# Patient Record
Sex: FEMALE | Race: WHITE | Employment: FULL TIME | URBAN - METROPOLITAN AREA
[De-identification: names, ages, dates, MRNs, and addresses within clinical notes are randomized per-mention and may not be internally consistent; named-entity substitution may affect disease eponyms.]

---

## 2017-01-18 ENCOUNTER — TELEPHONE (OUTPATIENT)
Dept: NEUROLOGY | Age: 47
End: 2017-01-18

## 2017-01-18 NOTE — TELEPHONE ENCOUNTER
Spoke to patient and scheduled nerve block. Message sent to [address] [place] [address]  to verify authorization. Appears to be approved in notes.

## 2017-01-18 NOTE — TELEPHONE ENCOUNTER
Pt called and wants lisa to call her back about the injection dr Teena Madrigal wants her to have. Pt said nurse was supposed to call her back about it. Please call.

## 2017-01-27 ENCOUNTER — OFFICE VISIT (OUTPATIENT)
Dept: NEUROLOGY | Age: 47
End: 2017-01-27

## 2017-01-27 VITALS
SYSTOLIC BLOOD PRESSURE: 110 MMHG | RESPIRATION RATE: 20 BRPM | HEIGHT: 67 IN | DIASTOLIC BLOOD PRESSURE: 64 MMHG | BODY MASS INDEX: 25.9 KG/M2 | WEIGHT: 165 LBS

## 2017-01-27 DIAGNOSIS — G50.0 SUPRAORBITAL NEURALGIA: Primary | ICD-10-CM

## 2017-01-27 NOTE — PATIENT INSTRUCTIONS
Bupivacaine (By injection)   Bupivacaine ([id number]-[id number]-v-rojas)  Used to numb an area of your body during surgery or other procedures, childbirth, or dental work. This medicine is a local anesthetic. Brand Name(s):BL Injection Kit, BT Injection Kit, Bupivacaine Spinal, Bupivilog Kit, Dexlido-M, Dyural-40, Dyural-80, Dyural-LM, Ketorocaine-LM, MLK F1 Kit, MLK F2 Kit, MLK F3 Kit, MLK F4, MLK Procedure F1 Kit, MLP A-1   There may be other brand names for this medicine. When This Medicine Should Not Be Used: This medicine is not right for everyone. Do not use it if you had an allergic reaction to bupivacaine or other types of local anesthetic. How to Use This Medicine:   Injectable  · Your doctor will prescribe your exact dose and tell you how the medicine will be given. This medicine is sometimes given through a catheter placed in your lower back for an epidural or a spinal block. You may also receive the injection into your rib cage, chest, or other body area. This medicine is injected directly into your gums for dental work. · A nurse or other health provider will give you this medicine. Drugs and Foods to Avoid:   Ask your doctor or pharmacist before using any other medicine, including over-the-counter medicines, vitamins, and herbal products. · Some medicines can affect how bupivacaine works. Tell your doctor if you are taking any of the following:   ¨ An MAO inhibitor  ¨ A tricyclic antidepressant  ¨ Blood pressure medicine, such as atenolol, doxazosin, lisinopril, terazosin, metoprolol  ¨ Depression medicine, such as amitriptyline, nortriptyline  ¨ Ergot medicine or other medicines for headaches or migraines  ¨ Phenothiazine medicine, such as promethazine, chlorpromazine  · Tell your doctor if you use anything else that makes you sleepy. Some examples are allergy medicine, narcotic pain medicine, and alcohol.   Warnings While Using This Medicine:   · If you are not receiving this medicine for childbirth, tell your doctor if you are pregnant or breastfeeding. Tell your doctor if you have asthma, diabetes, liver, kidney, or heart disease, seizures, myasthenia gravis, thyroid problems, circulation problems, high blood pressure, low blood pressure, or methemoglobinemia. · This medicine may make you dizzy or drowsy. Do not drive or do anything that could be dangerous until you know how this medicine affects you. · This medicine should cause numbness only to the area where it is injected. It is not meant to cause you to fall asleep or become unconscious. · It may be easier to hurt yourself while your treated body area is still numb. Be careful to avoid injury until you have regained all the feeling and are no longer numb. · If you are receiving this medicine as an epidural to ease labor pains, it may take longer than normal for you to push your baby out. It is also possible that the baby may have unwanted effects after birth (sleepiness, slow responses). Talk to your doctor if you have questions about how this medicine might affect your baby. Possible Side Effects While Using This Medicine:   Call your doctor right away if you notice any of these side effects:  · Allergic reaction: Itching or hives, swelling in your face or hands, swelling or tingling in your mouth or throat, chest tightness, trouble breathing  · Anxiety, depression, restlessness, drowsiness, ringing in your ears, blurred vision  · Chest pain, fast, pounding, slow, or uneven heartbeat, trouble breathing  · Lightheadedness or fainting  · Nausea, vomiting, chills, metallic taste in your mouth  · Seizures, shivering, shaking, or tremors  If you notice these less serious side effects, talk with your doctor:   · Headache, back pain  · Pain, redness, or swelling where the needle was placed  If you notice other side effects that you think are caused by this medicine, tell your doctor. Call your doctor for medical advice about side effects.  You may report side effects to FDA at 5-368-FDA-1034  © 2016 [address] is for End User's use only and may not be sold, redistributed or otherwise used for commercial purposes. The above information is an  only. It is not intended as medical advice for individual conditions or treatments. Talk to your doctor, nurse or pharmacist before following any medical regimen to see if it is safe and effective for you.

## 2017-01-27 NOTE — MR AVS SNAPSHOT
Visit Information Date & Time Provider Department Dept. Phone Encounter #  
 1/27/2017 10:00 AM Ara Jha MD [facility] [phone] [id number] Upcoming Health Maintenance Date Due DTaP/Tdap/Td series (1 - Tdap) 9/16/1992 PAP AKA CERVICAL CYTOLOGY 9/16/1992 BREAST CANCER SCRN MAMMOGRAM 8/27/2015 INFLUENZA AGE 9 TO ADULT 8/1/2016 COLONOSCOPY 7/19/2021 Allergies as of 1/27/2017  Review Complete On: 12/30/2016 By: Meredith Kenny MD  
  
 Severity Noted Reaction Type Reactions Ambien [Zolpidem]  08/13/2014    Other (comments) Too groggy Erythromycin  10/21/2011    Hives Keflex [Cephalexin]  11/17/2011    Hives Lunesta [Eszopiclone]  08/13/2014    Other (comments) Too groggy Pcn [Penicillins]  10/21/2011    Hives Sulfa (Sulfonamide Antibiotics)  10/21/2011    Hives Current Immunizations  Reviewed on 8/13/2014 Name Date Influenza Vaccine Split 10/27/2011 Influenza Vaccine Whole 10/29/2012 Not reviewed this visit You Were Diagnosed With   
  
 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  
 25.84 kg/m 2 1.88 m 2 Preferred Pharmacy Pharmacy Name Phone [facility] [address] [place] [address] Av [phone] Your Updated Medication List  
  
   
This list is accurate as of: 1/27/17 10:16 AM.  Always use your most recent med list.  
  
  
  
  
 ARMOUR THYROID 60 mg tablet Generic drug:  thyroid (Pork) 15 mg daily. calcium carbonate-vitamin d2 1,200-400 mg-unit Cap Take  by mouth. citalopram 40 mg tablet Commonly known as:  Felipe Mao Take 1 Tab by mouth daily. CONTRAVE 8-90 mg Tber ER tablet Generic drug:  naltrexone-buPROPion Take 3 Tabs by mouth daily. First Month: Contrave 8mg/90mg #70 Week 1 : 1 Tab AM Week 2 : 1 Tab AM, 1 Tab PM Week 3 : 2 Tab AM, 1 Tab PM Week 4 : 2 Tab AM, 2 Tab PM Week 5 and Beyond: Contrave 8mg/90mg #120  2 Tab AM, 2 Tab PM  
  
 cyanocobalamin 500 mcg tablet Commonly known as:  VITAMIN B12 Take 500 mcg by mouth daily. LINZESS 290 mcg Cap capsule Generic drug:  linaclotide Take  by mouth Daily (before breakfast). multivitamin tablet Commonly known as:  ONE A DAY Take 1 Tab by mouth daily. omeprazole 40 mg capsule Commonly known as:  PRILOSEC Take 40 mg by mouth daily. RECLIPSEN (28) 0.15-0.03 mg Tab Generic drug:  desogestrel-ethinyl estradiol Take 1 Tab by mouth daily. topiramate 100 mg tablet Commonly known as:  TOPAMAX Take 1 tablet by mouth two (2) times a day. traZODone 150 mg tablet Commonly known as:  [place] Take 1 Tab by mouth nightly. Patient Instructions Bupivacaine (By injection) Bupivacaine (wwb-XIZ-u-rojas) Used to numb an area of your body during surgery or other procedures, childbirth, or dental work. This medicine is a local anesthetic. Brand Name(s):BL Injection Kit, BT Injection Kit, Bupivacaine Spinal, Bupivilog Kit, Dexlido-M, Dyural-40, Dyural-80, Dyural-LM, Ketorocaine-LM, MLK F1 Kit, MLK F2 Kit, MLK F3 Kit, MLK F4, MLK Procedure F1 Kit, MLP A-1 There may be other brand names for this medicine. When This Medicine Should Not Be Used: This medicine is not right for everyone. Do not use it if you had an allergic reaction to bupivacaine or other types of local anesthetic. How to Use This Medicine:  
Injectable · Your doctor will prescribe your exact dose and tell you how the medicine will be given. This medicine is sometimes given through a catheter placed in your lower back for an epidural or a spinal block. You may also receive the injection into your rib cage, chest, or other body area. This medicine is injected directly into your gums for dental work. · A nurse or other health provider will give you this medicine. Drugs and Foods to Avoid: Ask your doctor or pharmacist before using any other medicine, including over-the-counter medicines, vitamins, and herbal products. · Some medicines can affect how bupivacaine works. Tell your doctor if you are taking any of the following: ¨ An MAO inhibitor ¨ A tricyclic antidepressant ¨ Blood pressure medicine, such as atenolol, doxazosin, lisinopril, terazosin, metoprolol ¨ Depression medicine, such as amitriptyline, nortriptyline ¨ Ergot medicine or other medicines for headaches or migraines ¨ Phenothiazine medicine, such as promethazine, chlorpromazine · Tell your doctor if you use anything else that makes you sleepy. Some examples are allergy medicine, narcotic pain medicine, and alcohol. Warnings While Using This Medicine: · If you are not receiving this medicine for childbirth, tell your doctor if you are pregnant or breastfeeding. Tell your doctor if you have asthma, diabetes, liver, kidney, or heart disease, seizures, myasthenia gravis, thyroid problems, circulation problems, high blood pressure, low blood pressure, or methemoglobinemia. · This medicine may make you dizzy or drowsy. Do not drive or do anything that could be dangerous until you know how this medicine affects you. · This medicine should cause numbness only to the area where it is injected. It is not meant to cause you to fall asleep or become unconscious. · It may be easier to hurt yourself while your treated body area is still numb. Be careful to avoid injury until you have regained all the feeling and are no longer numb. · If you are receiving this medicine as an epidural to ease labor pains, it may take longer than normal for you to push your baby out. It is also possible that the baby may have unwanted effects after birth (sleepiness, slow responses). Talk to your doctor if you have questions about how this medicine might affect your baby. Possible Side Effects While Using This Medicine:  
Call your doctor right away if you notice any of these side effects: · Allergic reaction: Itching or hives, swelling in your face or hands, swelling or tingling in your mouth or throat, chest tightness, trouble breathing · Anxiety, depression, restlessness, drowsiness, ringing in your ears, blurred vision · Chest pain, fast, pounding, slow, or uneven heartbeat, trouble breathing · Lightheadedness or fainting · Nausea, vomiting, chills, metallic taste in your mouth · Seizures, shivering, shaking, or tremors If you notice these less serious side effects, talk with your doctor:  
· Headache, back pain · Pain, redness, or swelling where the needle was placed If you notice other side effects that you think are caused by this medicine, tell your doctor. Call your doctor for medical advice about side effects. You may report side effects to FDA at 6-019-FDA-4051 © 2016 [address] is for End User's use only and may not be sold, redistributed or otherwise used for commercial purposes. The above information is an  only. It is not intended as medical advice for individual conditions or treatments. Talk to your doctor, nurse or pharmacist before following any medical regimen to see if it is safe and effective for you. Introducing [facility] & HE[facility] SERVICES! Josephine Bates introduces [employer] patient portal. Now you can access parts of your medical record, email your doctor's office, and request medication refills online. 1. In your internet browser, go to [email or web address] 2. Click on the First Time User? Click Here link in the Sign In box. You will see the New Member Sign Up page. 3. Enter your ArtVenue Access Code exactly as it appears below. You will not need to use this code after youve completed the sign-up process. If you do not sign up before the expiration date, you must request a new code. · [employer] Access Code: [email or web address] Expires: 3/16/2017  3:13 PM 
 
4. Enter the last four digits of your Social Security Number (xxxx) and Date of Birth (mm/dd/yyyy) as indicated and click Submit. You will be taken to the next sign-up page. 5. Create a ArtVenue ID. This will be your ArtVenue login ID and cannot be changed, so think of one that is secure and easy to remember. 6. Create a ArtVenue password. You can change your password at any time. 7. Enter your Password Reset Question and Answer. This can be used at a later time if you forget your password. 8. Enter your e-mail address. You will receive e-mail notification when new information is available in [address] 9. Click Sign Up. You can now view and download portions of your medical record. 10. Click the Download Summary menu link to download a portable copy of your medical information. If you have questions, please visit the Frequently Asked Questions section of the ArtVenue website. Remember, [employer]Venue is NOT to be used for urgent needs. For medical emergencies, dial 911. Now available from your iPhone and Android! Please provide this summary of care documentation to your next provider. Your primary care clinician is listed as Tarun Portillo. If you have any questions after today's visit, please call [phone].

## 2017-03-21 ENCOUNTER — TELEPHONE (OUTPATIENT)
Dept: NEUROLOGY | Age: 47
End: 2017-03-21

## 2017-03-21 NOTE — TELEPHONE ENCOUNTER
T/C contact Nithin  spoke w/ Denis Torres  regarding Supra orbital nerve block CPT [id number]  Auth# Not Required   Call Ref# [id number]    Vinod Alvarado informed of this matter for scheduling

## 2017-03-24 NOTE — TELEPHONE ENCOUNTER
Patient was scheduled a follow up appointment after blocks and cancelled. Patient has rescheduled appointment before next block.

## 2018-01-22 ENCOUNTER — OFFICE VISIT (OUTPATIENT)
Dept: SURGERY | Age: 48
End: 2018-01-22

## 2018-01-22 VITALS
BODY MASS INDEX: 25.74 KG/M2 | HEART RATE: 106 BPM | HEIGHT: 67 IN | DIASTOLIC BLOOD PRESSURE: 78 MMHG | SYSTOLIC BLOOD PRESSURE: 129 MMHG | RESPIRATION RATE: 20 BRPM | OXYGEN SATURATION: 99 % | WEIGHT: 164 LBS | TEMPERATURE: 97.6 F

## 2018-01-22 DIAGNOSIS — K90.2 BLIND LOOP SYNDROME: Primary | ICD-10-CM

## 2018-01-22 DIAGNOSIS — K59.9 COLONIC INERTIA: ICD-10-CM

## 2018-01-22 RX ORDER — GABAPENTIN 600 MG/1
TABLET ORAL
Refills: 0 | COMMUNITY
Start: 2018-01-12 | End: 2021-02-10 | Stop reason: SDUPTHER

## 2018-01-22 RX ORDER — PRASTERONE (DHEA) 25 MG
25 CAPSULE ORAL
Refills: 2 | COMMUNITY
Start: 2017-11-09

## 2018-01-22 RX ORDER — LEVOTHYROXINE, LIOTHYRONINE 19; 4.5 UG/1; UG/1
TABLET ORAL
Refills: 1 | COMMUNITY
Start: 2018-01-15 | End: 2021-02-10 | Stop reason: SDUPTHER

## 2018-01-22 RX ORDER — TIZANIDINE 2 MG/1
TABLET ORAL
Refills: 0 | COMMUNITY
Start: 2018-01-15 | End: 2021-02-10 | Stop reason: SDUPTHER

## 2018-01-22 RX ORDER — ONDANSETRON 4 MG/1
TABLET, FILM COATED ORAL
Refills: 3 | COMMUNITY
Start: 2018-01-10 | End: 2021-02-10 | Stop reason: SDUPTHER

## 2018-01-22 RX ORDER — OXYCODONE AND ACETAMINOPHEN 10; 325 MG/1; MG/1
TABLET ORAL
Refills: 0 | COMMUNITY
Start: 2018-01-12 | End: 2021-02-10 | Stop reason: SDUPTHER

## 2018-01-22 RX ORDER — TOPIRAMATE 200 MG/1
CAPSULE, EXTENDED RELEASE ORAL
Refills: 2 | COMMUNITY
Start: 2017-12-27 | End: 2021-02-10 | Stop reason: SDUPTHER

## 2018-01-22 RX ORDER — ERGOCALCIFEROL 1.25 MG/1
CAPSULE ORAL
Refills: 2 | COMMUNITY
Start: 2017-12-29 | End: 2021-02-10 | Stop reason: SDUPTHER

## 2018-01-22 NOTE — PROGRESS NOTES
1. Have you been to the ER, urgent care clinic since your last visit? Hospitalized since your last visit? No    2. Have you seen or consulted any other health care providers outside of the [address] [place] [address] since your last visit? Include any pap smears or colon screening.  No

## 2018-01-22 NOTE — MR AVS SNAPSHOT
[address] [facility] [address] 
[phone] Patient: Gomez Gerber MRN: [id number] Visit Information Date & Time Provider Department Dept. Phone Encounter #  
 1/22/2018  9:30 AM MD Sharron Olivares [phone] [id number] Upcoming Health Maintenance Date Due DTaP/Tdap/Td series (1 - Tdap) 9/16/1992 PAP AKA CERVICAL CYTOLOGY 9/16/1992 BREAST CANCER SCRN MAMMOGRAM 8/27/2015 Influenza Age 5 to Adult 8/1/2017 COLONOSCOPY 7/19/2021 Allergies as of 1/22/2018  Review Complete On: 1/22/2018 By: Esteban Sales Severity Noted Reaction Type Reactions Ambien [Zolpidem]  08/13/2014    Other (comments) Too groggy Erythromycin  10/21/2011    Hives Keflex [Cephalexin]  11/17/2011    Hives Lunesta [Eszopiclone]  08/13/2014    Other (comments) Too groggy Pcn [Penicillins]  10/21/2011    Hives Sulfa (Sulfonamide Antibiotics)  10/21/2011    Hives Current Immunizations  Reviewed on 8/13/2014 Name Date Influenza Vaccine Split 10/27/2011 Influenza Vaccine Whole 10/29/2012 Not reviewed this visit Vitals BP Pulse Temp Resp Height(growth percentile) Weight(growth percentile) 129/78 (BP 1 Location: Left arm, BP Patient Position: Sitting) (!) 106 97.6 °F (36.4 °C) (Oral) 20 5' 7\" (1.702 m) 164 lb (74.4 kg) SpO2 BMI OB Status Smoking Status 99% 25.69 kg/m2 Having regular periods Never Smoker BMI and BSA Data Body Mass Index Body Surface Area  
 25.69 kg/m 2 1.88 m 2 Preferred Pharmacy Pharmacy Name Phone [facility] [address] [facility] [address] [place] [address] [facility] [phone] Your Updated Medication List  
  
   
This list is accurate as of: 1/22/18  9:51 AM.  Always use your most recent med list.  
  
  
  
  
 * ARMOUR THYROID 60 mg tablet Generic drug:  thyroid (Pork) 15 mg daily. * NP THYROID 30 mg tablet Generic drug:  thyroid (Pork) TK 1 T PO QD IN THE MORNING  
  
 calcium carbonate-vitamin d2 1,200-400 mg-unit Cap Take  by mouth.  
  
 citalopram 40 mg tablet Commonly known as:  Lemmie Foster Take 1 Tab by mouth daily. CONTRAVE 8-90 mg Tber ER tablet Generic drug:  naltrexone-buPROPion Take 3 Tabs by mouth daily. First Month: Contrave 8mg/90mg #70 Week 1 : 1 Tab AM Week 2 : 1 Tab AM, 1 Tab PM Week 3 : 2 Tab AM, 1 Tab PM Week 4 : 2 Tab AM, 2 Tab PM Week 5 and Beyond: Contrave 8mg/90mg #120  2 Tab AM, 2 Tab PM  
  
 cyanocobalamin 500 mcg tablet Commonly known as:  VITAMIN B12 Take 500 mcg by mouth daily. DHEA 25 mg Cap Generic drug:  prasterone (dhea) Take 25 mg by mouth.  
  
 ergocalciferol 50,000 unit capsule Commonly known as:  ERGOCALCIFEROL TK 1 C PO TWICE WEEKLY WITH MEALS  
  
 gabapentin 600 mg tablet Commonly known as:  NEURONTIN  
TK 1 T PO  D  
  
 LINZESS 290 mcg Cap capsule Generic drug:  linaclotide Take  by mouth Daily (before breakfast). multivitamin tablet Commonly known as:  ONE A DAY Take 1 Tab by mouth daily. omeprazole 40 mg capsule Commonly known as:  PRILOSEC Take 40 mg by mouth daily. ondansetron hcl 4 mg tablet Commonly known as:  ZOFRAN  
TK 1 T PO TID PRF NAUSEA  
  
 oxyCODONE-acetaminophen  mg per tablet Commonly known as:  PERCOCET 10 TK 1 T PO  Q 6 H PRN P  
  
 RECLIPSEN (28) 0.15-0.03 mg Tab Generic drug:  desogestrel-ethinyl estradiol Take 1 Tab by mouth daily. tiZANidine 2 mg tablet Commonly known as:  Starlet Blush TK 1 T PO  BID * topiramate 100 mg tablet Commonly known as:  TOPAMAX Take 1 tablet by mouth two (2) times a day. * TROKENDI  mg capsule Generic drug:  topiramate ER TK 2 CS PO QD HS  
  
 traZODone 150 mg tablet Commonly known as:  Miguel Herzog  
 Take 1 Tab by mouth nightly. * Notice: This list has 4 medication(s) that are the same as other medications prescribed for you. Read the directions carefully, and ask your doctor or other care provider to review them with you. Introducing [facility]! [facility] introduces [employer] patient portal. Now you can access parts of your medical record, email your doctor's office, and request medication refills online. 1. In your internet browser, go to [email or web address] 2. Click on the First Time User? Click Here link in the Sign In box. You will see the New Member Sign Up page. 3. Enter your [employer] Access Code exactly as it appears below. You will not need to use this code after youve completed the sign-up process. If you do not sign up before the expiration date, you must request a new code. · [employer] Access Code: [email or web address] Expires: 4/22/2018  9:34 AM 
 
4. Enter the last four digits of your Social Security Number (xxxx) and Date of Birth (mm/dd/yyyy) as indicated and click Submit. You will be taken to the next sign-up page. 5. Create a [employer] ID. This will be your [employer] login ID and cannot be changed, so think of one that is secure and easy to remember. 6. Create a [employer] password. You can change your password at any time. 7. Enter your Password Reset Question and Answer. This can be used at a later time if you forget your password. 8. Enter your e-mail address. You will receive e-mail notification when new information is available in [address] 9. Click Sign Up. You can now view and download portions of your medical record. 10. Click the Download Summary menu link to download a portable copy of your medical information. If you have questions, please visit the Frequently Asked Questions section of the [employer] website. Remember, [employer] is NOT to be used for urgent needs. For medical emergencies, dial 911. Now available from your iPhone and Android! Please provide this summary of care documentation to your next provider. Your primary care clinician is listed as Sandra Kam. If you have any questions after today's visit, please call [phone].

## 2018-01-22 NOTE — PROGRESS NOTES
Initial Consultation for possible revision of previous Bariatric Surgery     Aleksandra Santiago is a 56 y.o. female who comes into the office today for initial consultation for follow up of previous bariatric surgery. Aleksandra Santiago has tried a variety of weight-loss attempts including a previous Lap gastric bypass in 2003 by me at [facility],  Her pre-surgery weight was 337 lbs. and her lowest postoperative body weight was aproximately 138 lbs. She has regained aproximately 25 lbs over recent year(s). She does receive feedback from the previous surgery, including fullness and bloating. She has severe constipation issues now taking Linzess which does not give her a bowel movement each day. She has severe abdominal pain relieved by a BM. Today she is Height: 5' 7\" (170.2 cm) , Weight: 164 lb (74.4 kg) for a BMI of Body mass index is 25.69 kg/(m^2). [facility] 1/22/2018 1/27/2017 12/30/2016 1/30/2015 8/20/2014 8/13/2014 5/8/2014   Today's Wt 164 lb 165 lb 165 lb 12.8 oz 138 lb 142 lb 8 oz 143 lb 4.8 oz 160 lb   BMI 25.69 kg/m2 25.84 kg/m2 25.97 kg/m2 22.44 kg/m2 23.18 kg/m2 23.31 kg/m2 25.05 kg/m2       Body mass index is 25.69 kg/(m^2). Past Medical History:   Diagnosis Date    Anemia     Binge eating 2010    with purging (initially). COMPULSIVE OVEREATING. Txd with [facility] x 32 days.  Chronic insomnia     Chronic LBP 1984    due to sports injuries.  Colonic inertia 2011    with CHRONIC CONSTIPATION. Dr. Violette Gupta at [facility]    Constipation     Depressive disorder, not elsewhere classified 2008    Dr. Niurka Palomares.  Endometriosis 1994    [facility]. Txd with [facility]CP.  Fatigue     Gallstone 12/2003    Dr. Ezequiel Torres.  IBS (irritable bowel syndrome) 1993    [facility].  Lumbar disc herniation 1995, 2012    L5-S1 then L4-5. Dr. Vannessa Reyes. Dr. Allyson Gifford.     Melanoma of back ([facility] [address] 1987    Migraine 1986    Muscle pain     Muscle weakness     N&V (nausea and vomiting)     Onychomycosis 2013    Proteinuria 2012    S/P gastric bypass 12/15/03    Dr. Franks [place].  Syncopal episodes 2013    Thyroid disease     Visual disturbance        Past Surgical History:   Procedure Laterality Date    HX BACK SURGERY  1995, 12/2012    herniated disc repair. L5-S1 then L4-5. Dr. Nathan Melo. Dr. Leonard Herrera.  HX CHOLECYSTECTOMY  12/15/2003    HX COLONOSCOPY  2000s    [facility].  due to constipation. [facility].  HX GI  2000s    [facility]. [facility].  HX HERNIA REPAIR  2004    DUE TO INTERNAL ABDOMINAL HERNIA . Dr. Franks [place].  HX LAP GASTRIC BYPASS  12/15/03    Dr. Marguerite Yi.  HX MALIGNANT SKIN LESION EXCISION  1987    from upper back    [facility]    Dr. John Maya. ANKLE ARTHROSCOPY. due to spots injury. Current Outpatient Prescriptions   Medication Sig Dispense Refill    NP THYROID 30 mg tablet TK 1 T PO QD IN THE MORNING  1    tiZANidine (ZANAFLEX) 2 mg tablet TK 1 T PO  BID  0    ergocalciferol (ERGOCALCIFEROL) 50,000 unit capsule TK 1 C PO TWICE WEEKLY WITH MEALS  2    gabapentin (NEURONTIN) 600 mg tablet TK 1 T PO  D  0    TROKENDI  mg capsule TK 2 CS PO QD HS  2    oxyCODONE-acetaminophen (PERCOCET 10)  mg per tablet TK 1 T PO  Q 6 H PRN P  0    ondansetron hcl (ZOFRAN) 4 mg tablet TK 1 T PO TID PRF NAUSEA  3    DHEA 25 mg cap Take 25 mg by mouth. 2    ARMOUR THYROID 60 mg tablet 15 mg daily. 1    linaclotide (LINZESS) 290 mcg cap capsule Take  by mouth Daily (before breakfast).  omeprazole (PRILOSEC) 40 mg capsule Take 40 mg by mouth daily.  cyanocobalamin (VITAMIN B12) 500 mcg tablet Take 500 mcg by mouth daily.  citalopram (CELEXA) 40 mg tablet Take 1 Tab by mouth daily. 90 Tab 1    traZODone (DESYREL) 150 mg tablet Take 1 Tab by mouth nightly. 30 Tab 5    desogestrel-ethinyl estradiol (RECLIPSEN, 28,) 0.15-30 mg-mcg per tablet Take 1 Tab by mouth daily.         multivitamin (ONE A DAY) tablet Take 1 Tab by mouth daily.  calcium carbonate-vitamin d2 1,200-400 mg-unit Cap Take  by mouth.  naltrexone-buPROPion (CONTRAVE) 8-90 mg TbER ER tablet Take 3 Tabs by mouth daily. First Month: Contrave 8mg/90mg #70  Week 1 : 1 Tab AM  Week 2 : 1 Tab AM, 1 Tab PM  Week 3 : 2 Tab AM, 1 Tab PM  Week 4 : 2 Tab AM, 2 Tab PM  Week 5 and Beyond: Contrave 8mg/90mg #120   2 Tab AM, 2 Tab PM      topiramate (TOPAMAX) 100 mg tablet Take 1 tablet by mouth two (2) times a day. 60 tablet 5       Allergies   Allergen Reactions    Ambien [Zolpidem] Other (comments)     Too groggy    Erythromycin Hives    Keflex [Cephalexin] Hives    Lunesta [Eszopiclone] Other (comments)     Too groggy    Pcn [Penicillins] Hives    Sulfa (Sulfonamide Antibiotics) Hives       Social History   Substance Use Topics    Smoking status: Never Smoker    Smokeless tobacco: Never Used      Comment: lived with smoker parents x 18 yrs, other smoker housemates    Alcohol use No      Comment: rarely 1-2 yearly       Family History   Problem Relation Age of Onset    Heart Attack Mother 54     massive or brain aneurysm    Headache Mother     Elevated Lipids Father     Diabetes Father     Hypertension Father     Kidney Disease Father      on dialysis    Cancer Maternal Grandfather 76     ?stomach/intestinal    Cancer Maternal Grandmother 76     mesothelioma    Liver Disease Paternal Grandfather      alcohol abuse    Heart Disease Paternal Grandmother      CABG x 2.       ROS, positive where in bold:    General: fevers, chills, night sweats, fatigue, weight loss, weight gain. GI: abdominal pain, nausea, vomiting, change in bowel habits, hematochezia, melena, or GERD. Integumentary: dermatitis or abnormal moles.     HEENT:  visual changes, vertigo, epistaxis, dysphagia, hoarseness    Cardiac: chest pain, palpitations, hypertension, edema,  varicosities    Resp:  cough, shortness of breath, wheezing, hemoptysis, snoring,  reactive airway disease    : hematuria, dysuria, frequency, urgency, nocturia, stress urinary incontinence, renal dysfuction    MSK: severe fatigue and weakness, joint pain,  arthritis    Endocrine: diabetes, thyroid disease, polyuria, polydipsia, polyphagia, poor wound healing, heat intolerance,cold intolerance. Lymph/Heme: anemia, bruising,  history of blood transfusions. Neuro: dizziness, headache, fainting, seizures, stroke. Psychiatry:  Anxiety, depression, psychosis      Physical Exam:  Visit Vitals    /78 (BP 1 Location: Left arm, BP Patient Position: Sitting)    Pulse (!) 106    Temp 97.6 °F (36.4 °C) (Oral)    Resp 20    Ht 5' 7\" (1.702 m)    Wt 164 lb (74.4 kg)    SpO2 99%    BMI 25.69 kg/m2       General: Well developed, obese 56 y.o. female in no acute distress  ENMT: normocephalic, atraumatic   Respiratory: excursions normal and symmetrical  Cardiovascular: Regular rate and rhythm  Abdomen:  No obvious hernia, somewhat distended no focal tenderness or guarding  Musculoskeletal: normal gait/station  Neuro: symmetrical  Psych: alert and oriented to person, place and time      Impression:    Mirlande Tinsley is a 56 y.o. female who is suffering from severe chronic constipation which appears to cause abdominal pain regularly which is relieved by passing a BM. She has other non specific symptoms such as tingling in her hands. Her annual blood work has been done by her PCP and she has not been seen in a bariatric program for some years- she had been followed by Dr Iona Lopez after I left [facility] but she was not happy with her ongoing follow up care.        Work up will include a CT of the ABD / Pelvis to assess the previous gastric bypass anatomy R/O internal hernia, intussusception and to evaluate her for megacolon-  In 2012 an ABD CT done for ER visit for abdominal pain showed very large colon- likely cause of  Her RLQ and lower abdominal pain by her history and the way she describes the pain as relieved by BM. In addition will check labs for nutrition follow up and will include Fe, B12, B1, Zn and Selenium levels. We will discuss the results of the above evaluation with the patient when testing is completed. She might benefit from evaluation by a colorectal surgeon for consideration of near total colectomy if colonic inertia is confirmed as the diagnosis given that Artie King has improved her situation but she still experiences abdominal pain most days despite taking this medication. More than 50% of this encounter was spent in direct counseling for this patient Counseling included topics such as the ongoing evaluation and treatment as well as options for future care. All questions have been answered in detail and the patient has expressed satisfaction regarding understanding of all this information. At least 45 minutes were spent in direct patient contact including more than 50% of the time spent directly counseling the patient as above during this encounter.

## 2018-01-25 LAB
ALBUMIN SERPL-MCNC: 4.1 G/DL (ref 3.5–5.5)
ALBUMIN/GLOB SERPL: 2 {RATIO} (ref 1.2–2.2)
ALP SERPL-CCNC: 66 IU/L (ref 39–117)
ALT SERPL-CCNC: 18 IU/L (ref 0–32)
AST SERPL-CCNC: 18 IU/L (ref 0–40)
BILIRUB SERPL-MCNC: 0.2 MG/DL (ref 0–1.2)
BUN SERPL-MCNC: 19 MG/DL (ref 6–24)
BUN/CREAT SERPL: 19 (ref 9–23)
CALCIUM SERPL-MCNC: 8.8 MG/DL (ref 8.7–10.2)
CHLORIDE SERPL-SCNC: 108 MMOL/L (ref 96–106)
CO2 SERPL-SCNC: 23 MMOL/L (ref 18–29)
CREAT SERPL-MCNC: 0.98 MG/DL (ref 0.57–1)
ERYTHROCYTE [DISTWIDTH] IN BLOOD BY AUTOMATED COUNT: 13.7 % (ref 12.3–15.4)
FERRITIN SERPL-MCNC: 21 NG/ML (ref 15–150)
GFR SERPLBLD CREATININE-BSD FMLA CKD-EPI: 69 ML/MIN/1.73
GFR SERPLBLD CREATININE-BSD FMLA CKD-EPI: 80 ML/MIN/1.73
GLOBULIN SER CALC-MCNC: 2.1 G/DL (ref 1.5–4.5)
GLUCOSE SERPL-MCNC: 85 MG/DL (ref 65–99)
HCT VFR BLD AUTO: 37.7 % (ref 34–46.6)
HGB BLD-MCNC: 12 G/DL (ref 11.1–15.9)
IRON SERPL-MCNC: 92 UG/DL (ref 27–159)
MCH RBC QN AUTO: 31.6 PG (ref 26.6–33)
MCHC RBC AUTO-ENTMCNC: 31.8 G/DL (ref 31.5–35.7)
MCV RBC AUTO: 99 FL (ref 79–97)
PLATELET # BLD AUTO: 258 X10E3/UL (ref 150–379)
POTASSIUM SERPL-SCNC: 5.6 MMOL/L (ref 3.5–5.2)
PROT SERPL-MCNC: 6.2 G/DL (ref 6–8.5)
RBC # BLD AUTO: 3.8 X10E6/UL (ref 3.77–5.28)
SELENIUM BLD-MCNC: 218 UG/L (ref 100–340)
SODIUM SERPL-SCNC: 142 MMOL/L (ref 134–144)
VIT B1 BLD-SCNC: 169.5 NMOL/L (ref 66.5–200)
VIT B12 SERPL-MCNC: 1551 PG/ML (ref 232–1245)
WBC # BLD AUTO: 6.9 X10E3/UL (ref 3.4–10.8)
ZINC SERPL-MCNC: 71 UG/DL (ref 56–134)

## 2018-05-11 ENCOUNTER — HOSPITAL ENCOUNTER (OUTPATIENT)
Dept: CT IMAGING | Age: 48
Discharge: HOME | End: 2018-05-11
Attending: SURGERY
Payer: COMMERCIAL

## 2018-05-11 DIAGNOSIS — K59.9 COLONIC INERTIA: ICD-10-CM

## 2018-05-11 PROCEDURE — 74177 CT ABD & PELVIS W/CONTRAST: CPT

## 2018-05-11 PROCEDURE — 74011636320 HC RX REV CODE- 636/320: Performed by: RADIOLOGY

## 2018-05-11 RX ADMIN — IOPAMIDOL 98 ML: 755 INJECTION, SOLUTION INTRAVENOUS at 09:14

## 2018-10-11 ENCOUNTER — APPOINTMENT (OUTPATIENT)
Dept: PHYSICAL THERAPY | Age: 48
End: 2018-10-11

## 2018-12-31 ENCOUNTER — HOSPITAL ENCOUNTER (OUTPATIENT)
Dept: MRI IMAGING | Age: 48
Discharge: HOME | End: 2018-12-31
Attending: PAIN MEDICINE
Payer: COMMERCIAL

## 2018-12-31 DIAGNOSIS — M96.1 POSTLAMINECTOMY SYNDROME, LUMBAR REGION: ICD-10-CM

## 2018-12-31 DIAGNOSIS — M51.16 INTERVERTEBRAL DISC DISORDER WITH RADICULOPATHY OF LUMBAR REGION: ICD-10-CM

## 2018-12-31 PROCEDURE — A9575 INJ GADOTERATE MEGLUMI 0.1ML: HCPCS | Performed by: PAIN MEDICINE

## 2018-12-31 PROCEDURE — 74011250636 HC RX REV CODE- 250/636: Performed by: PAIN MEDICINE

## 2018-12-31 PROCEDURE — 72158 MRI LUMBAR SPINE W/O & W/DYE: CPT

## 2018-12-31 RX ORDER — GADOTERATE MEGLUMINE 376.9 MG/ML
12 INJECTION INTRAVENOUS
Status: COMPLETED | OUTPATIENT
Start: 2018-12-31 | End: 2018-12-31

## 2018-12-31 RX ADMIN — GADOTERATE MEGLUMINE 12 ML: 376.9 INJECTION INTRAVENOUS at 11:32

## 2019-01-03 ENCOUNTER — OFFICE VISIT (OUTPATIENT)
Dept: SURGERY | Age: 49
End: 2019-01-03

## 2019-01-03 VITALS
WEIGHT: 148 LBS | DIASTOLIC BLOOD PRESSURE: 90 MMHG | HEIGHT: 67 IN | HEART RATE: 127 BPM | OXYGEN SATURATION: 98 % | TEMPERATURE: 99 F | SYSTOLIC BLOOD PRESSURE: 140 MMHG | BODY MASS INDEX: 23.23 KG/M2 | RESPIRATION RATE: 20 BRPM

## 2019-01-03 DIAGNOSIS — T40.2X5A THERAPEUTIC OPIOID-INDUCED CONSTIPATION (OIC): ICD-10-CM

## 2019-01-03 DIAGNOSIS — K91.2 POSTSURGICAL MALABSORPTION: Primary | ICD-10-CM

## 2019-01-03 DIAGNOSIS — K59.03 THERAPEUTIC OPIOID-INDUCED CONSTIPATION (OIC): ICD-10-CM

## 2019-01-03 RX ORDER — DOCUSATE SODIUM 100 MG/1
100 CAPSULE, LIQUID FILLED ORAL 2 TIMES DAILY
COMMUNITY

## 2019-01-03 RX ORDER — PHENTERMINE HYDROCHLORIDE 37.5 MG/1
37.5 CAPSULE ORAL
COMMUNITY

## 2019-01-03 NOTE — PROGRESS NOTES
1. Have you been to the ER, urgent care clinic since your last visit? Hospitalized since your last visit? yes leukocytoclastic vasculitis at [facility]     2. Have you seen or consulted any other health care providers outside of the [address] [place] [address] since your last visit? Include any pap smears or colon screening.  Yes Dr Manas Aguirre for the vasculitis

## 2019-01-04 ENCOUNTER — DOCUMENTATION ONLY (OUTPATIENT)
Dept: SURGERY | Age: 49
End: 2019-01-04

## 2019-01-06 NOTE — PATIENT INSTRUCTIONS
Constipation: Care Instructions  Your Care Instructions    Constipation means that you have a hard time passing stools (bowel movements). People pass stools from 3 times a day to once every 3 days. What is normal for you may be different. Constipation may occur with pain in the rectum and cramping. The pain may get worse when you try to pass stools. Sometimes there are small amounts of bright red blood on toilet paper or the surface of stools. This is because of enlarged veins near the rectum (hemorrhoids). A few changes in your diet and lifestyle may help you avoid ongoing constipation. Your doctor may also prescribe medicine to help loosen your stool. Some medicines can cause constipation. These include pain medicines and antidepressants. Tell your doctor about all the medicines you take. Your doctor may want to make a medicine change to ease your symptoms. Follow-up care is a key part of your treatment and safety. Be sure to make and go to all appointments, and call your doctor if you are having problems. It's also a good idea to know your test results and keep a list of the medicines you take. How can you care for yourself at home? · Drink plenty of fluids, enough so that your urine is light yellow or clear like water. If you have kidney, heart, or liver disease and have to limit fluids, talk with your doctor before you increase the amount of fluids you drink. · Include high-fiber foods in your diet each day. These include fruits, vegetables, beans, and whole grains. · Get at least 30 minutes of exercise on most days of the week. Walking is a good choice. You also may want to do other activities, such as running, swimming, cycling, or playing tennis or team sports. · Take a fiber supplement, such as Citrucel or Metamucil, every day. Read and follow all instructions on the label. · Schedule time each day for a bowel movement. A daily routine may help.  Take your time having your bowel movement. · Support your feet with a small step stool when you sit on the toilet. This helps flex your hips and places your pelvis in a squatting position. · Your doctor may recommend an over-the-counter laxative to relieve your constipation. Examples are Milk of Magnesia and MiraLax. Read and follow all instructions on the label. Do not use laxatives on a long-term basis. When should you call for help? Call your doctor now or seek immediate medical care if:    · You have new or worse belly pain.     · You have new or worse nausea or vomiting.     · You have blood in your stools.    Watch closely for changes in your health, and be sure to contact your doctor if:    · Your constipation is getting worse.     · You do not get better as expected. Where can you learn more? Go to http://miguel-kal.info/. Enter 21 [id number] in the search box to learn more about \"Constipation: Care Instructions. \"  Current as of: November 20, 2017  Content Version: 11.8  © 0434-9386 [employer]. Care instructions adapted under license by [employer] (which disclaims liability or warranty for this information). If you have questions about a medical condition or this instruction, always ask your healthcare professional. [address] any warranty or liability for your use of this information.

## 2019-01-06 NOTE — PROGRESS NOTES
Subjective:      Yael Dean is a 53 y.o. female presents for postop care 16 years following LRYGBP ([facility]). Appetite is fair. Eating a regular diet with diffuse, post-prandial abdominal pain. Bowel movements are constipated (history of colonic inertia) with intermittent watery diarrhea; she continues to take Linzess and TID Percocet for chronic back pain. No GERD symptoms or regurgitation. Objective:     Visit Vitals  /90   Pulse (!) 127   Temp 99 °F (37.2 °C)   Resp 20   Ht 5' 7\" (1.702 m)   Wt 148 lb (67.1 kg)   SpO2 98%   BMI 23.18 kg/m²       General:  alert, cooperative, no distress, appears stated age   Abdomen: deferred   Incision:   deferred     Assessment:     Post-surgical malabsorption    Chronic constipation (OIC) with intermittent diarrhea. She also has history of IBS; symptoms worsened after restarting Percocet for back pain. CT scan with severe pan-colonic fecal burden. Plan:     1. Continue current medications. Minimize opioids. 2. Diet as tolerated. 3. Registered Dietician referral.  4. Referral to Dr. Trip Lion for management of IOC/IBS.

## 2019-08-19 ENCOUNTER — TELEPHONE (OUTPATIENT)
Dept: SURGERY | Age: 49
End: 2019-08-19

## 2019-08-19 NOTE — TELEPHONE ENCOUNTER
Pt was given a script for Voltaren Gel 1% for hip by another physician since this is a Nsaid, is it ok to rub on? She knows she is not to take them orally. If can get a call back.

## 2019-08-19 NOTE — TELEPHONE ENCOUNTER
I called the patient and I let her know that it was okay to use the Voltaren Gel she just can't take NSAIDS by mouth. Pt in agreement.

## 2021-02-10 ENCOUNTER — TELEPHONE (OUTPATIENT)
Dept: SURGERY | Age: 51
End: 2021-02-10

## 2021-02-10 ENCOUNTER — OFFICE VISIT (OUTPATIENT)
Dept: SURGERY | Age: 51
End: 2021-02-10
Payer: COMMERCIAL

## 2021-02-10 VITALS
HEIGHT: 67 IN | OXYGEN SATURATION: 90 % | SYSTOLIC BLOOD PRESSURE: 153 MMHG | HEART RATE: 100 BPM | TEMPERATURE: 97.9 F | DIASTOLIC BLOOD PRESSURE: 82 MMHG | WEIGHT: 153.8 LBS | BODY MASS INDEX: 24.14 KG/M2

## 2021-02-10 DIAGNOSIS — K58.1 IRRITABLE BOWEL SYNDROME WITH CONSTIPATION: Primary | ICD-10-CM

## 2021-02-10 DIAGNOSIS — K59.9 COLONIC INERTIA: ICD-10-CM

## 2021-02-10 DIAGNOSIS — R10.30 LOWER ABDOMINAL PAIN: ICD-10-CM

## 2021-02-10 PROCEDURE — 99204 OFFICE O/P NEW MOD 45 MIN: CPT | Performed by: COLON & RECTAL SURGERY

## 2021-02-10 RX ORDER — HYDROXYZINE 50 MG/1
TABLET, FILM COATED ORAL
COMMUNITY

## 2021-02-10 RX ORDER — TITANIUM DIOXIDE, OCTINOXATE, ZINC OXIDE 4.61; 1.6; .78 G/40ML; G/40ML; G/40ML
CREAM TOPICAL
COMMUNITY

## 2021-02-10 RX ORDER — GABAPENTIN 600 MG/1
TABLET ORAL
COMMUNITY

## 2021-02-10 RX ORDER — DESOGESTREL AND ETHINYL ESTRADIOL 0.15-0.03
1 KIT ORAL DAILY
COMMUNITY

## 2021-02-10 RX ORDER — TRAMADOL HYDROCHLORIDE 50 MG/1
TABLET ORAL
COMMUNITY

## 2021-02-10 RX ORDER — ERGOCALCIFEROL 1.25 MG/1
CAPSULE ORAL
COMMUNITY

## 2021-02-10 RX ORDER — TRAZODONE HYDROCHLORIDE 150 MG/1
TABLET ORAL
COMMUNITY

## 2021-02-10 RX ORDER — LEVOTHYROXINE AND LIOTHYRONINE 57; 13.5 UG/1; UG/1
TABLET ORAL
COMMUNITY

## 2021-02-10 RX ORDER — LANOLIN ALCOHOL/MO/W.PET/CERES
CREAM (GRAM) TOPICAL
COMMUNITY

## 2021-02-10 RX ORDER — LEVOTHYROXINE SODIUM 50 UG/1
TABLET ORAL
COMMUNITY

## 2021-02-10 RX ORDER — TIZANIDINE 4 MG/1
TABLET ORAL
COMMUNITY

## 2021-02-10 RX ORDER — OMEPRAZOLE 40 MG/1
CAPSULE, DELAYED RELEASE ORAL
COMMUNITY

## 2021-02-10 NOTE — PROGRESS NOTES
OFFICE VISIT NOTE    Annabel Baker is a 53 y.o. female who presents to the office today for:    Chief Complaint   Patient presents with    New Patient     abdomen pain       Ms. 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  At that time she was on narcotic analgesics for back pain and did have slow transit constipation likely secondary to her narcotic analgesic use. She was managed ultimately on a regimen of Linzess 145 mcg, senna 3 tablets each a.m. and p.m., Colace 2 tablets each a.m. and p.m. I have not seen her for quite some time however she states that after that more recently she started having problems with constipation again. She was seen by Dr. Cassandra Pavon who increased her Linzess to 290 mcg/day. Her constipation improved again with the increase in Linzess however over the last 3 years she states she has been having diarrhea intermittently. She states that it got bad enough that she has she started having issues with continence. Her primary care physician treated for bacterial overgrowth however this did not help. Her gastroenterologist told to stop everything and then she developed constipation once again. She did start back on the Linzess however she continues to have constipation and so she added senna and Colace. Currently she states that she is having problems with bloating and abdominal pain. She does however continue to have intermittent diarrhea. Past Medical History:   Diagnosis Date    Anemia     Binge eating 2010    with purging (initially). COMPULSIVE OVEREATING. Txd with [facility] x 32 days.  Chronic insomnia     Chronic LBP 1984    due to sports injuries.  Colonic inertia 2011    with CHRONIC CONSTIPATION.   Dr. Yisel Cotton at [facility]    Constipation     Depressive disorder, not elsewhere classified 2008    Dr. Shauna Greer.  Endometriosis 1994    [facility] GYN resident. Txd with BCP.  Fatigue     Gallstone 12/2003    Dr. Adriana Ordonez.  IBS (irritable bowel syndrome) 1993    [facility] GI resident.  Lumbar disc herniation 1995, 2012    L5-S1 then L4-5. Dr. Олег Pereyra. Dr. Sourav Diego.  Melanoma of back ([address] 1987    Migraine 1986    Muscle pain     Muscle weakness     N&V (nausea and vomiting)     Onychomycosis 2013    Proteinuria 2012    S/P gastric bypass 12/15/03    Dr. Meera Tabares.  Syncopal episodes 2013    Thyroid disease     Visual disturbance        Past Surgical History:   Procedure Laterality Date    HX BACK SURGERY  1995, 12/2012    herniated disc repair. L5-S1 then L4-5. Dr. Олег Pereyra. Dr. Sourav Diego.  HX CHOLECYSTECTOMY  12/15/2003    HX COLONOSCOPY  2008    [facility].  due to constipation. Dr. Roque Sawant.  HX GI  2000s    [facility]. Dr. Roque Sawant.  HX HERNIA REPAIR  2004    DUE TO INTERNAL ABDOMINAL HERNIA . Dr. Meera Tabares.  HX LAP GASTRIC BYPASS  12/15/03    Dr. Adriana Ordonez.  HX MALIGNANT SKIN LESION EXCISION  1987    from upper back    Radha Model    Dr. Eliazar Felton. ANKLE ARTHROSCOPY. due to spots injury. Family History   Problem Relation Age of Onset    Heart Attack Mother 54        massive or brain aneurysm    Headache Mother     Elevated Lipids Father     Diabetes Father     Hypertension Father     Kidney Disease Father         on dialysis    Cancer Maternal Grandfather 76        ?stomach/intestinal    Cancer Maternal Grandmother 76        mesothelioma    Liver Disease Paternal Grandfather         alcohol abuse    Heart Disease Paternal Grandmother         CABG x 2.        Social History     Socioeconomic History    Marital status: SINGLE     Spouse name: Not on file    Number of children: Not on file    Years of education: Not on file    Highest education level: Not on file   Occupational History    Not on file   Social Needs    Financial resource strain: Not on file    Food insecurity     Worry: Not on file     Inability: Not on file    Transportation needs     Medical: Not on file     Non-medical: Not on file   Tobacco Use    Smoking status: Never Smoker    Smokeless tobacco: Never Used    Tobacco comment: lived with smoker parents x 18 yrs, other smoker housemates   Substance and Sexual Activity    Alcohol use: No     Alcohol/week: 0.0 standard drinks     Comment: rarely 1-2 yearly    Drug use: No    Sexual activity: Yes     Partners: Female     Birth control/protection: Pill   Lifestyle    Physical activity     Days per week: Not on file     Minutes per session: Not on file    Stress: Not on file   Relationships    Social connections     Talks on phone: Not on file     Gets together: Not on file     Attends [employer] service: Not on file     Active member of club or organization: Not on file     Attends meetings of clubs or organizations: Not on file     Relationship status: Not on file    Intimate partner violence     Fear of current or ex partner: Not on file     Emotionally abused: Not on file     Physically abused: Not on file     Forced sexual activity: Not on file   Other Topics Concern    Not on file   Social History Narrative    Not on file       Allergies   Allergen Reactions    Keflex [Cephalexin] Hives    Pcn [Penicillins] Hives    Sulfa (Sulfonamide Antibiotics) Hives    Ambien [Zolpidem] Other (comments)     Too groggy    Lunesta [Eszopiclone] Other (comments)     Too groggy    Erythromycin Other (comments)     Patient doesn't recall any reaction       Current Outpatient Medications   Medication Sig    hydrOXYzine HCL (ATARAX) 50 mg tablet hydroxyzine HCl 50 mg tablet   TAKE 1 TABLET BY MOUTH THREE TIMES DAILY AS NEEDED    Iron, Cbn & Gluc-FA-B12-C-DSS (Ferralet 90 Dual-Iron Delivery) 90-1-12-50 mg-mg-mcg-mg tab Ferralet 90 Dual-Iron Delivery 90 mg-1 mg-12 mcg-50 mg tablet Take 1 tablet every day by oral route at noon.  levothyroxine (Unithroid) 50 mcg tablet Unithroid 50 mcg tablet    traMADoL (ULTRAM) 50 mg tablet tramadol 50 mg tablet    cyanocobalamin (VITAMIN B12) 500 mcg tablet cyanocobalamin (vit B-12) 500 mcg tablet   Take 1 tablet every day by oral route in the morning.  ergocalciferol (ERGOCALCIFEROL) 1,250 mcg (50,000 unit) capsule Vitamin D2 50,000 unit capsule   Take 1 capsule twice a week by oral route with meals.  gabapentin (NEURONTIN) 600 mg tablet gabapentin 600 mg tablet    omeprazole (PRILOSEC) 40 mg capsule omeprazole 40 mg capsule,delayed release   Take 1 capsule every day by oral route for 30 days.  thyroid, Pork, (NP Thyroid) 90 mg tablet NP Thyroid 90 mg tablet    tiZANidine (ZANAFLEX) 4 mg tablet tizanidine 4 mg tablet   TAKE 1 TABLET BY MOUTH THREE TIMES DAILY FOR MUSCLE SPASMS    topiramate ER (Trokendi XR) 200 mg capsule Trokendi  mg capsule, extended release   TAKE 1 CAPSULE BY MOUTH EVERY DAY AT BEDTIME    traZODone (DESYREL) 150 mg tablet trazodone 150 mg tablet   TAKE 1. 5 TABLETs  BY MOUTH EVERY NIGHT AT BEDTIME    desogestreL-ethinyl estradioL (Isibloom) 0.15-0.03 mg tab Take 1 Tab by mouth daily.  cranberry 400 mg cap Take  by mouth.  phentermine 37.5 mg capsule Take 37.5 mg by mouth every morning.  docusate sodium (STOOL SOFTENER) 100 mg capsule Take 100 mg by mouth two (2) times a day.  sennosides (LAXATIVE PILLS PO) Take  by mouth.  DHEA 25 mg cap Take 25 mg by mouth.  linaclotide (LINZESS) 290 mcg cap capsule Take  by mouth Daily (before breakfast).  multivitamin (ONE A DAY) tablet Take 1 Tab by mouth daily. No current facility-administered medications for this visit. Review of Systems   Constitutional: Positive for malaise/fatigue and weight loss. HENT: Negative. Eyes: Negative. Respiratory: Negative. Cardiovascular: Positive for palpitations and leg swelling. Gastrointestinal: Positive for abdominal pain, blood in stool, constipation, diarrhea, nausea and vomiting. Genitourinary: Negative. Musculoskeletal: Positive for back pain, joint pain and myalgias. Skin: Negative. Neurological: Positive for dizziness, tremors and headaches. Endo/Heme/Allergies:        Hypothyroid   Psychiatric/Behavioral: The patient has insomnia. BP (!) 153/82 (BP 1 Location: Left upper arm, BP Patient Position: Sitting)   Pulse 100   Temp 97.9 °F (36.6 °C) (Temporal)   Ht 5' 7\" (1.702 m)   Wt 153 lb 12.8 oz (69.8 kg)   SpO2 90%   BMI 24.09 kg/m²     Physical Exam  Constitutional:       General: She is not in acute distress. Appearance: Normal appearance. She is not ill-appearing. HENT:      Head: Normocephalic and atraumatic. Neck:      Musculoskeletal: Normal range of motion and neck supple. Cardiovascular:      Rate and Rhythm: Normal rate and regular rhythm. Pulses: Normal pulses. Heart sounds: Normal heart sounds. Pulmonary:      Breath sounds: Normal breath sounds. Abdominal:      General: There is no distension. Palpations: Abdomen is soft. There is no mass. Tenderness: There is abdominal tenderness (Tender to palpation lower quadrants). Hernia: No hernia is present. Musculoskeletal: Normal range of motion. Skin:     General: Skin is warm and dry. Neurological:      General: No focal deficit present. Mental Status: She is alert. Mental status is at baseline. Psychiatric:         Mood and Affect: Mood normal.         Thought Content: Thought content normal.         Judgment: Judgment normal.         Problem List Items Addressed This Visit        Digestive    IBS (irritable bowel syndrome) - Primary    Relevant Medications    omeprazole (PRILOSEC) 40 mg capsule    Colonic inertia    Relevant Medications    omeprazole (PRILOSEC) 40 mg capsule          Assessment and Plan:    I told Ms. Desai that I recommend she drop back down to 145 mcg of Linzess daily which hopefully will take care of the diarrhea issue. In terms of her pain and will go ahead and order CT scan of the abdomen pelvis. If this is negative then I would recommend a repeat colonoscopy. Its been about 13 years since her last colonoscopy and she is due for a colonoscopy. She will follow-up with me after CT scan is complete.           Justin Alfaro MD

## 2021-02-10 NOTE — TELEPHONE ENCOUNTER
Patient called an said that she can't afford to have a CT done because she would have to pay better than $1000 for the test. Please call patient to let her know if there is another avenue that she can take.  Thanks

## 2021-02-11 NOTE — TELEPHONE ENCOUNTER
Returned pt call. She says she called insurance company and they will not pay more for her Ct Scan. She has a 2,000 dollar deductible and the Ct Scan is 1,500?anjelica. She wants to know is there another road to take that might be a little cheaper. However if the ct is what you need done to help her, she will do it. Please let her know what to do?  Thank you

## 2021-02-12 NOTE — TELEPHONE ENCOUNTER
I have reached out to [facility] the supervisor of Patient access to inquire about this patient. She did not respond back to me. I asked if we were still using [employer], again not response. I do not believe that we participate with this program. The hospital will offer a 20% discount if paid in full prior to CT. Her  Deductible is $2000 and Out of Pocket is $7000 with 30% coinsurance.

## 2021-02-18 NOTE — TELEPHONE ENCOUNTER
Patient called again would like you to know her ct scan has been cancelled and insurance states needs peer to peer with dr Nate Gordon. Patient would like you to call her to let her know what direction she should go with this situation.  I

## 2021-02-22 NOTE — TELEPHONE ENCOUNTER
Patient called again would like the nurse to call her back in reference to her ct scan and peer to peer and would like to know what to do from this point on

## 2021-04-06 ENCOUNTER — TRANSCRIBE ORDER (OUTPATIENT)
Dept: SCHEDULING | Age: 51
End: 2021-04-06

## 2021-04-06 DIAGNOSIS — M54.16 LUMBAR RADICULOPATHY: Primary | ICD-10-CM

## 2021-04-07 ENCOUNTER — TRANSCRIBE ORDER (OUTPATIENT)
Dept: SCHEDULING | Age: 51
End: 2021-04-07

## 2021-04-07 DIAGNOSIS — M51.16 LUMBAR DISC PROLAPSE WITH ROOT COMPRESSION: ICD-10-CM

## 2021-04-07 DIAGNOSIS — M96.1 POSTLAMINECTOMY SYNDROME, CERVICAL REGION: ICD-10-CM

## 2021-04-07 DIAGNOSIS — M48.061 SPINAL STENOSIS, LUMBAR REGION, WITHOUT NEUROGENIC CLAUDICATION: Primary | ICD-10-CM

## 2021-04-07 DIAGNOSIS — Z79.891 ADMISSION FOR LONG-TERM OPIATE ANALGESIC USE: ICD-10-CM

## 2021-07-01 ENCOUNTER — TELEPHONE (OUTPATIENT)
Dept: SLEEP MEDICINE | Age: 51
End: 2021-07-01

## 2023-05-11 RX ORDER — HYDROXYZINE 50 MG/1
TABLET, FILM COATED ORAL
COMMUNITY

## 2023-05-11 RX ORDER — TOPIRAMATE 200 MG/1
CAPSULE, EXTENDED RELEASE ORAL
COMMUNITY

## 2023-05-11 RX ORDER — GABAPENTIN 600 MG/1
TABLET ORAL
COMMUNITY

## 2023-05-11 RX ORDER — PHENTERMINE HYDROCHLORIDE 37.5 MG/1
CAPSULE ORAL
COMMUNITY

## 2023-05-11 RX ORDER — PRASTERONE (DHEA) 25 MG
25 CAPSULE ORAL
COMMUNITY
Start: 2017-11-09

## 2023-05-11 RX ORDER — PSEUDOEPHEDRINE HCL 30 MG
TABLET ORAL 2 TIMES DAILY
COMMUNITY

## 2023-05-11 RX ORDER — ERGOCALCIFEROL 1.25 MG/1
CAPSULE ORAL
COMMUNITY

## 2023-05-11 RX ORDER — LEVOTHYROXINE SODIUM 0.05 MG/1
TABLET ORAL
COMMUNITY

## 2023-05-11 RX ORDER — TRAZODONE HYDROCHLORIDE 150 MG/1
TABLET ORAL
COMMUNITY

## 2023-05-11 RX ORDER — OMEPRAZOLE 40 MG/1
CAPSULE, DELAYED RELEASE ORAL
COMMUNITY

## 2023-05-11 RX ORDER — DESOGESTREL AND ETHINYL ESTRADIOL 0.15-0.03
1 KIT ORAL DAILY
COMMUNITY

## 2023-05-11 RX ORDER — LEVOTHYROXINE AND LIOTHYRONINE 57; 13.5 UG/1; UG/1
TABLET ORAL
COMMUNITY

## 2023-05-11 RX ORDER — TRAMADOL HYDROCHLORIDE 50 MG/1
TABLET ORAL
COMMUNITY

## 2023-05-11 RX ORDER — TIZANIDINE 4 MG/1
TABLET ORAL
COMMUNITY

## 2023-10-04 NOTE — PROCEDURES
NEUROLOGY CLINIC [place]  OFFICE PROCEDURE PROGRESS NOTE        Chart reviewed for the following:   Agus Aguirre MD, have reviewed the History, Physical and updated the Allergic reactions for Rosamaria CHANDA [facility] performed immediately prior to start of procedure:   Agus Aguirre MD, have performed the following reviews on [id number] [place] [address] prior to the start of the procedure:            * Patient was identified by name and date of birth   * Agreement on procedure being performed was verified  * Risks and Benefits explained to the patient  * Procedure site verified and marked as necessary  * Patient was positioned for comfort  * Consent was signed and verified     Time: 1005    Date of procedure: 1/27/2017    Procedure performed by:  Deven Wright MD    Provider assisted by: none    Patient assisted by: self    How tolerated by patient:  tolerated the procedure well with no complications    Comments: none          [address] [place] [address]       [id number]      1/27/2017       PERFORMING PHYSICIAN: Deven Wright MD   PROCEDURE: Supraorbital nerve block, RIGHT  CPT Code: 84655  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 The patient was placed in a slightly inclined position on the bed. The area of tenderness was palpated over the RIGHT mid-pupillary line, on the superior orbital rim. A line was drawn on the forehead to kimo the mid-pupillary position. While holding gauze over the RIGHT eyelid, the area was cleaned with alcohol swab x 3.  1.0 mL of Bupivacaine was drawn up in sterile fashion. The area was sterilized again with alcohol swabs. The needle was inserted perpendicular to the supra-orbital ridge at the level of the mid-pupillary line.   After contacting bony ridge, the needle was R: 9:40PM Bed Search Update [facility] Only    No bed availability.  Pt remains on PPS worklist awaiting appropriate placement.   slightly withdrawn, directed superiorly, and after negative aspiration, the solution was injected in a fan-like distribution. The needle was withdrawn and there were no obvious complications. The patient had full range of eye movements after the injection and ambulated out of the room without assistance.

## 2024-07-29 ENCOUNTER — OFFICE VISIT (OUTPATIENT)
Age: 54
End: 2024-07-29

## 2024-07-29 VITALS
HEIGHT: 67 IN | OXYGEN SATURATION: 98 % | TEMPERATURE: 98.3 F | DIASTOLIC BLOOD PRESSURE: 93 MMHG | RESPIRATION RATE: 16 BRPM | SYSTOLIC BLOOD PRESSURE: 145 MMHG | WEIGHT: 155 LBS | BODY MASS INDEX: 24.33 KG/M2 | HEART RATE: 78 BPM

## 2024-07-29 DIAGNOSIS — K59.00 CONSTIPATION, UNSPECIFIED CONSTIPATION TYPE: ICD-10-CM

## 2024-07-29 DIAGNOSIS — R03.0 ELEVATED BLOOD PRESSURE READING: ICD-10-CM

## 2024-07-29 DIAGNOSIS — N39.9 URINARY PROBLEM IN FEMALE: Primary | ICD-10-CM

## 2024-07-29 DIAGNOSIS — R10.2 PELVIC PAIN: ICD-10-CM

## 2024-07-29 DIAGNOSIS — R30.0 DYSURIA: ICD-10-CM

## 2024-07-29 LAB
BILIRUBIN, URINE, POC: NEGATIVE
BLOOD URINE, POC: NEGATIVE
GLUCOSE URINE, POC: NEGATIVE
KETONES, URINE, POC: NEGATIVE
LEUKOCYTE ESTERASE, URINE, POC: NEGATIVE
NITRITE, URINE, POC: NEGATIVE
PH, URINE, POC: 6 (ref 4.6–8)
PROTEIN,URINE, POC: NEGATIVE
SPECIFIC GRAVITY, URINE, POC: 1.01 (ref 1–1.03)
URINALYSIS CLARITY, POC: CLEAR
URINALYSIS COLOR, POC: YELLOW
UROBILINOGEN, POC: NORMAL

## 2024-07-29 RX ORDER — PHENAZOPYRIDINE HYDROCHLORIDE 100 MG/1
100 TABLET, FILM COATED ORAL 3 TIMES DAILY PRN
Qty: 30 TABLET | Refills: 0 | Status: SHIPPED | OUTPATIENT
Start: 2024-07-29 | End: 2024-08-08

## 2024-07-29 RX ORDER — POLYETHYLENE GLYCOL 3350 17 G/17G
17 POWDER, FOR SOLUTION ORAL DAILY
Qty: 30 EACH | Refills: 0 | Status: SHIPPED | OUTPATIENT
Start: 2024-07-29 | End: 2024-08-28

## 2024-07-29 NOTE — PROGRESS NOTES
movements intact.      Conjunctiva/sclera: Conjunctivae normal.      Pupils: Pupils are equal, round, and reactive to light.   Cardiovascular:      Rate and Rhythm: Normal rate and regular rhythm.   Pulmonary:      Effort: Pulmonary effort is normal. No respiratory distress.      Breath sounds: Normal breath sounds.   Abdominal:      General: Abdomen is flat. Bowel sounds are normal.      Palpations: Abdomen is soft.      Tenderness: There is abdominal tenderness. There is guarding. There is no right CVA tenderness or left CVA tenderness.   Genitourinary:     Comments: Pelvis pain, midline  Musculoskeletal:         General: Normal range of motion.      Cervical back: Normal range of motion.   Lymphadenopathy:      Cervical: No cervical adenopathy.   Skin:     General: Skin is warm.      Findings: No rash.   Neurological:      General: No focal deficit present.      Mental Status: She is alert and oriented to person, place, and time. Mental status is at baseline.   Psychiatric:         Mood and Affect: Mood normal.         Behavior: Behavior normal.         Thought Content: Thought content normal.         Judgment: Judgment normal.         We discussed when to utilize emergency services. Patient verbalized understanding.    An electronic signature was used to authenticate this note.      Sarath Cortes, APRN - CNP

## 2024-07-30 NOTE — PATIENT INSTRUCTIONS
Needs referral to urogynecology. Will call patient with appt set up w [facility]  Will call with culture result.  Continue taking macrobid and finish medication.  Please follow up with your PCP for elevated blood pressure in office today x 2.   Follow up in 5-7 days if symptoms persist or if symptoms worsen or go o your nearest ED for further evaluation.

## 2024-07-31 LAB — BACTERIA UR CULT: NORMAL

## 2024-10-15 ENCOUNTER — OFFICE VISIT (OUTPATIENT)
Age: 54
End: 2024-10-15

## 2024-10-15 VITALS
SYSTOLIC BLOOD PRESSURE: 150 MMHG | OXYGEN SATURATION: 99 % | RESPIRATION RATE: 16 BRPM | BODY MASS INDEX: 24.83 KG/M2 | TEMPERATURE: 97.4 F | DIASTOLIC BLOOD PRESSURE: 95 MMHG | WEIGHT: 158.2 LBS | HEIGHT: 67 IN | HEART RATE: 82 BPM

## 2024-10-15 DIAGNOSIS — J06.9 UPPER RESPIRATORY TRACT INFECTION, UNSPECIFIED TYPE: ICD-10-CM

## 2024-10-15 DIAGNOSIS — R39.9 URINARY SYMPTOM OR SIGN: ICD-10-CM

## 2024-10-15 DIAGNOSIS — N30.01 ACUTE CYSTITIS WITH HEMATURIA: Primary | ICD-10-CM

## 2024-10-15 LAB
BILIRUBIN, URINE, POC: NEGATIVE
BLOOD URINE, POC: ABNORMAL
GLUCOSE URINE, POC: NEGATIVE
KETONES, URINE, POC: NEGATIVE
LEUKOCYTE ESTERASE, URINE, POC: ABNORMAL
NITRITE, URINE, POC: NEGATIVE
PH, URINE, POC: 6 (ref 4.6–8)
PROTEIN,URINE, POC: NEGATIVE
SPECIFIC GRAVITY, URINE, POC: 1 (ref 1–1.03)
URINALYSIS CLARITY, POC: CLEAR
URINALYSIS COLOR, POC: YELLOW
UROBILINOGEN, POC: ABNORMAL MG/DL

## 2024-10-15 RX ORDER — PHENAZOPYRIDINE HYDROCHLORIDE 200 MG/1
200 TABLET, FILM COATED ORAL 3 TIMES DAILY PRN
Qty: 9 TABLET | Refills: 0 | Status: SHIPPED | OUTPATIENT
Start: 2024-10-15 | End: 2024-10-18

## 2024-10-15 RX ORDER — PHENAZOPYRIDINE HYDROCHLORIDE 200 MG/1
200 TABLET, FILM COATED ORAL 3 TIMES DAILY PRN
Qty: 9 TABLET | Refills: 0 | Status: SHIPPED | OUTPATIENT
Start: 2024-10-15 | End: 2024-10-15

## 2024-10-15 RX ORDER — NITROFURANTOIN 25; 75 MG/1; MG/1
100 CAPSULE ORAL 2 TIMES DAILY
Qty: 20 CAPSULE | Refills: 0 | Status: SHIPPED | OUTPATIENT
Start: 2024-10-15 | End: 2024-10-25

## 2024-10-15 NOTE — PROGRESS NOTES
Raquel Eagle (:  1970) is a 54 y.o. female,Established patient, here for evaluation of the following chief complaint(s):  Urinary Tract Infection (Pt c/o possible UTI that started yesterday. She also thinks she has an URI.) and URI        SUBJECTIVE/OBJECTIVE:    History provided by:  Patient  Urinary Tract Infection    Cold Symptoms          54 y.o. female presents with symptoms of dysuria that started yesterday.  States in excruciating pain, feels like \"I have fire between my legs.\"  Also admits to urinary urgency and increase frequency.  Admits to bladder pressure and low back pain.  No flank pain.  No fevers or chills.    Seeing a urogynecolgist, has been seeing him since August or September.    History of frequent UTIs when younger, was on OCP a long time and since stopping it has been UTIs frequently again.  Has scripts of Macrobid at home.    Patient also complaining of nasal congestion and runny nose that just started.  No sore throat.  No fevers of chills.  Little cough.        Vitals:    10/15/24 1711   BP: (!) 150/95   Pulse: 82   Resp: 16   Temp: 97.4 °F (36.3 °C)   TempSrc: Oral   SpO2: 99%   Weight: 71.8 kg (158 lb 3.2 oz)   Height: 1.702 m (5' 7\")       Results for orders placed or performed in visit on 10/15/24   AMB POC URINALYSIS DIP STICK MANUAL W/O MICRO   Result Value Ref Range    Color (UA POC) Yellow     Clarity (UA POC) Clear     Glucose, Urine, POC Negative     Bilirubin, Urine, POC Negative     Ketones, Urine, POC Negative     Specific Gravity, Urine, POC 1.005 1.001 - 1.035    Blood (UA POC) Trace     pH, Urine, POC 6.0 4.6 - 8.0    Protein, Urine, POC Negative     Urobilinogen, POC 0.2 mg/dL <1.1 mg/dL    Nitrite, Urine, POC Negative Negative    Leukocyte Esterase, Urine, POC 1+         Physical Exam  Constitutional:       General: She is not in acute distress.     Appearance: Normal appearance. She is not ill-appearing or toxic-appearing.   HENT:      Head: Normocephalic and

## 2024-10-15 NOTE — PATIENT INSTRUCTIONS
Your urinalysis was positive for infection today and you have been prescribed an antibiotic, Macrobid, twice daily for 10 days.     There are some things you can do at home to help relieve your symptoms while giving the antibiotic time to work (follow age appropriate package dosing instructions for over-the-counter medications):    - Drink plenty of fluids (especially water) to help dilute your urine and aid in flushing out bacteria  - Avoid drinks that may irritate your bladder (coffee, alcohol, citrus juice)    A urine culture has been sent to the lab, the results will help confirm you are taking the most appropriate antibiotic. You will be notified of the results when they are available (this can take 2-3 days).     Please contact your Primary Care Provider (PCP) to schedule a follow-up appointment.    If you have worsening symptoms, such as fever, flank pain, or vomiting, proceed to the ER for further evaluation.    ---------------------------------------------------------------------------------------------------------------------------------------------------------  Upper respiratory infection -  This early on recommend symptomatic treatment, please see below for guidelines:    Thank you for visiting [facility] today    Nasal Congestion:  Flonase or Nasonex (over the counter) nasal spray, once a day  Normal saline nasal spray  Afrin nasal spray no longer than three days  If you have high blood pressure, take Coricidin HBP (or the generic form) instead. Follow instructions on the box  Cough:  Throat lozenges, hot tea, and honey may help  Vicks VapoRub at night to help with cough and relieve muscles aches and pain  If not prescribed a cough medication, Robitussin DM is an option. It is an over the counter cough medication containing dextromethorphan to help suppress cough at night   *Please only take when absolutely needed, as this is a controlled substance that can cause addiction   *Please only

## 2024-10-17 ENCOUNTER — TELEPHONE (OUTPATIENT)
Age: 54
End: 2024-10-17

## 2024-10-17 NOTE — TELEPHONE ENCOUNTER
Pt called regarding UA results, advised that provider will leave a note as soon as they get a chance and we'll call her back

## 2024-10-18 LAB — BACTERIA UR CULT: ABNORMAL

## 2024-10-19 ENCOUNTER — TELEPHONE (OUTPATIENT)
Age: 54
End: 2024-10-19

## 2024-10-19 NOTE — TELEPHONE ENCOUNTER
Spoke with patient and informed her that the urine culture came back positive and to continue antibiotics. Patient reported that she isnt seeing much improvement in her symptoms. Patient and Provider Cy Smith CNP, had a detailed conversation regarding what antibiotics were appropriate for ecoli sensitivity and what antibiotics she is allergic to. Provider advised to go to the ER for IV antibiotic therapy if symptoms worsen. Patient is also working on an underlying condition with her urologist and she isn't sure if her symptoms are related to that or the UTI. Patient to finish out the course of current antibiotics and follow up with her urologist. Patient verbalized understanding.   Breath sounds clear and equal bilaterally.

## 2024-12-26 ENCOUNTER — TELEPHONE (OUTPATIENT)
Age: 54
End: 2024-12-26

## 2024-12-26 NOTE — TELEPHONE ENCOUNTER
Called patient to schedule an appointment based on referral to sleep medicine. Unable to leave message with patient to schedule. Referral received and scanned into media 12/26/2024.

## 2025-01-27 ENCOUNTER — OFFICE VISIT (OUTPATIENT)
Age: 55
End: 2025-01-27

## 2025-01-27 VITALS
WEIGHT: 161 LBS | TEMPERATURE: 98.3 F | DIASTOLIC BLOOD PRESSURE: 64 MMHG | RESPIRATION RATE: 16 BRPM | OXYGEN SATURATION: 97 % | SYSTOLIC BLOOD PRESSURE: 99 MMHG | HEART RATE: 87 BPM | BODY MASS INDEX: 25.22 KG/M2

## 2025-01-27 DIAGNOSIS — H10.9 BACTERIAL CONJUNCTIVITIS OF RIGHT EYE: ICD-10-CM

## 2025-01-27 DIAGNOSIS — S01.81XA LACERATION W/O FOREIGN BODY OF OTH PART OF HEAD, INIT ENCNTR: Primary | ICD-10-CM

## 2025-01-27 RX ORDER — TRAMADOL HYDROCHLORIDE 100 MG/1
100 TABLET, EXTENDED RELEASE ORAL PRN
COMMUNITY
Start: 2024-12-30

## 2025-01-27 RX ORDER — BACITRACIN ZINC AND POLYMYXIN B SULFATE 500; 1000 [USP'U]/G; [USP'U]/G
OINTMENT TOPICAL 3 TIMES DAILY
Qty: 1 EACH | Refills: 1 | Status: SHIPPED | OUTPATIENT
Start: 2025-01-27 | End: 2025-02-03

## 2025-01-27 RX ORDER — LISINOPRIL 2.5 MG/1
2.5 TABLET ORAL DAILY
COMMUNITY

## 2025-01-27 RX ORDER — GRANULES FOR ORAL 3 G/1
POWDER ORAL
COMMUNITY
Start: 2025-01-03

## 2025-01-27 RX ORDER — HYDROXYZINE HYDROCHLORIDE 25 MG/1
25 TABLET, FILM COATED ORAL NIGHTLY
COMMUNITY
Start: 2024-12-06

## 2025-01-27 RX ORDER — CARVEDILOL 12.5 MG/1
12.5 TABLET ORAL 2 TIMES DAILY WITH MEALS
COMMUNITY

## 2025-01-27 ASSESSMENT — ENCOUNTER SYMPTOMS
EYE DISCHARGE: 1
EYE REDNESS: 1
GASTROINTESTINAL NEGATIVE: 1
RESPIRATORY NEGATIVE: 1
ALLERGIC/IMMUNOLOGIC NEGATIVE: 1

## 2025-01-27 NOTE — PROGRESS NOTES
2025   Raquel Eagle (: 1970) is a 54 y.o. female, New patient, here for evaluation of the following chief complaint(s):  Laceration (Laceration to forehead via car door since 1030 this morning ) and Stye (Stye to Right lower eyelid x3 days)     ASSESSMENT/PLAN:  Below is the assessment and plan developed based on review of pertinent history, physical exam, labs, studies, and medications.  1. Laceration w/o foreign body of oth part of head, init encntr  -     LACERATION REPAIR  2. Bacterial conjunctivitis of right eye  -     bacitracin-polymyxin b (POLYSPORIN) [id number] UNIT/GM ointment; Apply topically 3 times daily for 7 days Apply topically 2 times daily., Topical, 3 TIMES DAILY Starting Mon 2025, Until Mon 2/3/2025, For 7 days, Disp-1 each, R-1, Normal         Handout given with care instructions  2. OTC for symptom management. Increase fluid intake, ensure adequate nutritional intake.  3. Follow up with PCP as needed.  4. Go to ED with development of any acute symptoms.     Follow up:  Return if symptoms worsen or fail to improve.  Follow up immediately for any new, worsening or changes or if symptoms are not improving over the next 5-7 days.     SUBJECTIVE/OBJECTIVE:  HPI     Raquel Eagle is a 54 y.o. female who presents to clinic right erythema and drainage x 3 days. Patient reports having a stye a few weeks in the right eye that resolved. Patient denies injury to eye, changes in vision, eye pain and photophobia.      Patient also presents to clinic with laceration to forehead that happened at 10:30 am this morning when she hit her head on door. Patient denies dizziness, HA, and LOC. Patient denies OTC medications. Patient repots laceration has been intermittently \"oozing blood.\"     Review of Systems   Constitutional: Negative.    HENT: Negative.     Eyes:  Positive for discharge and redness.   Respiratory: Negative.     Cardiovascular: Negative.    Gastrointestinal: Negative.

## 2025-01-27 NOTE — PROGRESS NOTES
Raquel Eagle (:  1970) is a 54 y.o. female,Established patient, here for evaluation of the following chief complaint(s):  Laceration (Laceration to forehead via car door since 1030 this morning ) and Stye (Stye to Right lower eyelid x3 days)      Assessment & Plan :  Visit Diagnoses and Associated Orders       Laceration w/o foreign body of oth part of head, init encntr    -  Primary         Bacterial conjunctivitis of right eye        bacitracin-polymyxin b (POLYSPORIN) [id number] UNIT/GM ointment [855]           ORDERS WITHOUT AN ASSOCIATED DIAGNOSIS    traMADol (ULTRAM ER) 100 MG extended release tablet [68541]      hydrOXYzine HCl (ATARAX) 25 MG tablet [3774]      lisinopril (PRINIVIL;ZESTRIL) 2.5 MG tablet [10040]      fosfomycin tromethamine (MONUROL) 3 g PACK [16103]      carvedilol (COREG) 12.5 MG tablet [97378]                 Follow up in 7 days if symptoms persist or if symptoms worsen.       Subjective :    Laceration          54 y.o. female presents with laceration to forehead that happen at 10:30 am today after hitting head on car door. Patient denies LOC, dizziness or HA.    Consented for procedure.       Vitals:    25 1513   BP: 99/64   Site: Left Upper Arm   Position: Sitting   Cuff Size: Medium Adult   Pulse: 87   Resp: 16   Temp: 98.3 °F (36.8 °C)   SpO2: 97%   Weight: 73 kg (161 lb)       No results found for this visit on 25.      Objective   Physical Exam  Constitutional:       Appearance: She is normal weight.   HENT:      Head: Normocephalic.      Right Ear: Tympanic membrane normal.      Left Ear: Tympanic membrane normal.      Nose: Nose normal.      Mouth/Throat:      Mouth: Mucous membranes are moist.      Pharynx: Oropharynx is clear.   Eyes:      Pupils: Pupils are equal, round, and reactive to light.   Cardiovascular:      Rate and Rhythm: Normal rate and regular rhythm.      Pulses: Normal pulses.      Heart sounds: Normal heart sounds.   Pulmonary:

## 2025-01-27 NOTE — PATIENT INSTRUCTIONS
Thank you for visiting [facility] today.    Simple hygiene measures can help minimize transmission to others:  Cold compresses throughout the day and warm compresses in morning  Artificial tears for dry eye  Adults or children with bacterial or viral conjunctivitis should not share handkerchiefs, tissues, towels, cosmetics or bedsheets/pillows with uninfected family or friends  Hand washing is an essential and highly effective way to prevent the spread of infection.  Hands should be wet with water and plain soap and rubbed together for 15-30 seconds.  It is not necessary to use antibacterial hand soap.  Teach children to wash their hands before and after eating and after touching the eyes, coughing or sneezing  Alcohol based hand rubs are a good alternative for disinfecting hands if a sink is not available.  Hand rubs should be spread over the entire surface of hands, fingers and wrists until dry, and they may be used several times.  These rubs can be used repeatedly without skin irritation or loss of effectiveness  Ophthalmologist if no improvement in 4 days    Please follow up with your PCP as needed.    Go to the ER for worsening or persistent symptoms, changes in vision, severe headache with nausea or pain with eye movement.      - Laceration cleaned with wound /betadine. Laceration is well approximated/superficial and hemostasis is noted. Suture is not required, Derma bond applied and left open to air. Discussed wound care/management at home. Patient verbalizes understanding and is in agreement with plan.      F/u if you begin to experience fever, chills, body aches and purulent drainage from wound.